# Patient Record
Sex: MALE | Race: BLACK OR AFRICAN AMERICAN | Employment: UNEMPLOYED | URBAN - METROPOLITAN AREA
[De-identification: names, ages, dates, MRNs, and addresses within clinical notes are randomized per-mention and may not be internally consistent; named-entity substitution may affect disease eponyms.]

---

## 2018-09-11 LAB
ALBUMIN SERPL-MCNC: 4.6 G/DL
ALP BLD-CCNC: 47 U/L
ALT SERPL-CCNC: 116 U/L
ANION GAP SERPL CALCULATED.3IONS-SCNC: 11 MMOL/L
AST SERPL-CCNC: 76 U/L
BASOPHILS ABSOLUTE: 0 /ΜL
BASOPHILS RELATIVE PERCENT: 0.5 %
BILIRUB SERPL-MCNC: 0.8 MG/DL (ref 0.1–1.4)
BUN BLDV-MCNC: 26 MG/DL
CALCIUM SERPL-MCNC: 9.5 MG/DL
CHLORIDE BLD-SCNC: 104 MMOL/L
CO2: 29 MMOL/L
CREAT SERPL-MCNC: 1.3 MG/DL
EOSINOPHILS ABSOLUTE: 0.3 /ΜL
EOSINOPHILS RELATIVE PERCENT: 3.6 %
GFR CALCULATED: >60
GLUCOSE BLD-MCNC: 98 MG/DL
HCT VFR BLD CALC: 47.6 % (ref 41–53)
HEMOGLOBIN: 15.9 G/DL (ref 13.5–17.5)
LYMPHOCYTES ABSOLUTE: 2.5 /ΜL
LYMPHOCYTES RELATIVE PERCENT: 33.8 %
MCH RBC QN AUTO: 30.8 PG
MCHC RBC AUTO-ENTMCNC: 33.4 G/DL
MCV RBC AUTO: 92.2 FL
MONOCYTES ABSOLUTE: 0.6 /ΜL
MONOCYTES RELATIVE PERCENT: 7.7 %
NEUTROPHILS ABSOLUTE: 4.1 /ΜL
NEUTROPHILS RELATIVE PERCENT: 54.4 %
PDW BLD-RTO: 13.4 %
PLATELET # BLD: 202 K/ΜL
PMV BLD AUTO: 8.3 FL
POTASSIUM SERPL-SCNC: 4 MMOL/L
RBC # BLD: 5.2 10^6/ΜL
SODIUM BLD-SCNC: 140 MMOL/L
TOTAL PROTEIN: 7.6
WBC # BLD: 7.5 10^3/ML

## 2018-10-10 ENCOUNTER — HOSPITAL ENCOUNTER (EMERGENCY)
Age: 33
Discharge: HOME | End: 2018-10-10
Attending: EMERGENCY MEDICINE
Payer: COMMERCIAL

## 2018-10-10 VITALS
HEIGHT: 73 IN | BODY MASS INDEX: 30.48 KG/M2 | RESPIRATION RATE: 14 BRPM | TEMPERATURE: 98.5 F | SYSTOLIC BLOOD PRESSURE: 159 MMHG | WEIGHT: 230 LBS | DIASTOLIC BLOOD PRESSURE: 73 MMHG | HEART RATE: 66 BPM | OXYGEN SATURATION: 98 %

## 2018-10-10 DIAGNOSIS — R09.81 NASAL CONGESTION: Primary | ICD-10-CM

## 2018-10-10 DIAGNOSIS — J01.10 ACUTE FRONTAL SINUSITIS, RECURRENCE NOT SPECIFIED: ICD-10-CM

## 2018-10-10 PROCEDURE — 99283 EMERGENCY DEPT VISIT LOW MDM: CPT

## 2018-10-10 RX ORDER — CEFDINIR 300 MG/1
300 CAPSULE ORAL 2 TIMES DAILY
Qty: 20 CAPSULE | Refills: 0 | Status: SHIPPED | OUTPATIENT
Start: 2018-10-10 | End: 2018-10-20

## 2018-10-10 ASSESSMENT — ENCOUNTER SYMPTOMS
SINUS PRESSURE: 1
WHEEZING: 0
EYE DISCHARGE: 0
SHORTNESS OF BREATH: 0
FACIAL SWELLING: 0
SINUS PAIN: 1
EYE REDNESS: 0
STRIDOR: 0
BACK PAIN: 0
CONSTIPATION: 0
SORE THROAT: 0
DIARRHEA: 0
BLOOD IN STOOL: 0
CHOKING: 0
ABDOMINAL PAIN: 0
TROUBLE SWALLOWING: 0
CHEST TIGHTNESS: 0
COUGH: 1
EYE PAIN: 0
VOICE CHANGE: 0
VOMITING: 0

## 2018-10-10 NOTE — ED PROVIDER NOTES
[address] [place] [address] ED  eMERGENCY dEPARTMENT eNCOUnter      Pt Name: Mary Burrows  MRN: [id number]  [place] 1985  Date of evaluation: 10/10/2018  Provider: Janifer Simmonds, MD    [address] [place] [address]       Chief Complaint   Patient presents with    Nausea     no vomiting    Cough     pt. states coughed up blood 3 days ago    Dizziness       HISTORY OF PRESENT ILLNESS   (Location/Symptom, Timing/Onset,Context/Setting, Quality, Duration, Modifying Factors, Severity)  Note limiting factors. Mary Burrows is a 29 y.o. male who presents to the emergency department Patient comes with the congestion no congestion postnasal drip denied any chest tightness or chest pain patient usually a nosebleed when he was child patient is an ex-smoker has a chills but no documented fever patient nonsmoker at this time the symptoms are there for last to 3 weeks time decided to come today to be checked out    HPI    NursingNotes were reviewed. REVIEW OF SYSTEMS    (2-9 systems for level 4, 10 or more for level 5)     Review of Systems   Constitutional: Positive for activity change, appetite change and chills. Negative for fever. HENT: Positive for postnasal drip, sinus pain and sinus pressure. Negative for congestion, drooling, facial swelling, mouth sores, nosebleeds, sore throat, trouble swallowing and voice change. Eyes: Negative for pain, discharge, redness and visual disturbance. Respiratory: Positive for cough. Negative for choking, chest tightness, shortness of breath, wheezing and stridor. Cardiovascular: Negative for chest pain, palpitations and leg swelling. Gastrointestinal: Negative for abdominal pain, blood in stool, constipation, diarrhea and vomiting. Endocrine: Negative for cold intolerance, polyphagia and polyuria. Genitourinary: Negative for dysuria, flank pain, frequency, genital sores and urgency. Musculoskeletal: Negative for back pain, joint swelling, neck pain and neck stiffness.    Skin: Negative for pallor and rash. Neurological: Negative for tremors, seizures, syncope, weakness, numbness and headaches. Hematological: Negative for adenopathy. Does not bruise/bleed easily. Psychiatric/Behavioral: Negative for agitation, behavioral problems, hallucinations and sleep disturbance. The patient is not hyperactive. All other systems reviewed and are negative. Except as noted above the remainder of the review of systems was reviewed and negative. PAST MEDICAL HISTORY   History reviewed. No pertinent past medical history. SURGICALHISTORY     History reviewed. No pertinent surgical history. CURRENT MEDICATIONS       Previous Medications    No medications on file       ALLERGIES     Patient has no known allergies. FAMILY HISTORY     History reviewed. No pertinent family history. SOCIAL HISTORY       Social History     Social History    Marital status: Single     Spouse name: N/A    Number of children: N/A    Years of education: N/A     Social History Main Topics    Smoking status: Never Smoker    Smokeless tobacco: None    Alcohol use No    Drug use: Yes     Types: Marijuana    Sexual activity: Not Asked     Other Topics Concern    None     Social History Narrative    None       SCREENINGS      @FLOW([id number])@      PHYSICAL EXAM    (up to 7 for level 4, 8 or more for level 5)     ED Triage Vitals [10/10/18 1624]   BP Temp Temp Source Pulse Resp SpO2 Height Weight   (!) 158/108 98.5 °F (36.9 °C) Oral 66 14 98 % 6' 1\" (1.854 m) 230 lb (104.3 kg)       Physical Exam   Constitutional: He is oriented to person, place, and time. He appears well-developed. HENT:   Head: Normocephalic. Active diagnoses and sounds very congested nasally with erythema of the nasal turbinates no active bleeding from the nose noted at this time patient will the oropharynx no blisters no ulcer abscess noted to the   Eyes: Right eye exhibits no discharge. Left eye exhibits no discharge.

## 2018-11-14 ENCOUNTER — OFFICE VISIT (OUTPATIENT)
Dept: FAMILY MEDICINE CLINIC | Age: 33
End: 2018-11-14
Payer: COMMERCIAL

## 2018-11-14 VITALS
WEIGHT: 240 LBS | SYSTOLIC BLOOD PRESSURE: 112 MMHG | DIASTOLIC BLOOD PRESSURE: 64 MMHG | HEART RATE: 72 BPM | BODY MASS INDEX: 32.51 KG/M2 | RESPIRATION RATE: 14 BRPM | TEMPERATURE: 97.6 F | HEIGHT: 72 IN

## 2018-11-14 DIAGNOSIS — R74.8 ELEVATED LIVER ENZYMES: ICD-10-CM

## 2018-11-14 DIAGNOSIS — Z00.00 HEALTHCARE MAINTENANCE: ICD-10-CM

## 2018-11-14 DIAGNOSIS — R68.89 HEAT INTOLERANCE: ICD-10-CM

## 2018-11-14 DIAGNOSIS — K52.9 CHRONIC DIARRHEA: Primary | ICD-10-CM

## 2018-11-14 DIAGNOSIS — Z11.4 SCREENING FOR HIV (HUMAN IMMUNODEFICIENCY VIRUS): ICD-10-CM

## 2018-11-14 PROCEDURE — G8417 CALC BMI ABV UP PARAM F/U: HCPCS | Performed by: FAMILY MEDICINE

## 2018-11-14 PROCEDURE — 99204 OFFICE O/P NEW MOD 45 MIN: CPT | Performed by: FAMILY MEDICINE

## 2018-11-14 PROCEDURE — 1036F TOBACCO NON-USER: CPT | Performed by: FAMILY MEDICINE

## 2018-11-14 PROCEDURE — G8484 FLU IMMUNIZE NO ADMIN: HCPCS | Performed by: FAMILY MEDICINE

## 2018-11-14 PROCEDURE — G8427 DOCREV CUR MEDS BY ELIG CLIN: HCPCS | Performed by: FAMILY MEDICINE

## 2018-11-14 ASSESSMENT — PATIENT HEALTH QUESTIONNAIRE - PHQ9
SUM OF ALL RESPONSES TO PHQ QUESTIONS 1-9: 0
SUM OF ALL RESPONSES TO PHQ9 QUESTIONS 1 & 2: 0
SUM OF ALL RESPONSES TO PHQ QUESTIONS 1-9: 0
2. FEELING DOWN, DEPRESSED OR HOPELESS: 0
1. LITTLE INTEREST OR PLEASURE IN DOING THINGS: 0

## 2018-11-14 NOTE — PATIENT INSTRUCTIONS
clean. Wash your hands, cutting boards, and countertops with hot soapy water frequently. · Cook meat until it is well done. · Do not eat raw eggs or uncooked sauces made with raw eggs. · Do not take chances. If food looks or tastes spoiled, throw it out. When should you call for help? Call 911 anytime you think you may need emergency care. For example, call if:    · You vomit blood or what looks like coffee grounds.     · You passed out (lost consciousness).     · You pass maroon or very bloody stools.    Call your doctor now or seek immediate medical care if:    · You have severe belly pain.     · You have signs of needing more fluids. You have sunken eyes, a dry mouth, and pass only a little dark urine.     · You feel like you are going to faint.     · You have increased belly pain that does not go away in 1 to 2 days.     · You have new or increased nausea, or you are vomiting.     · You have a new or higher fever.     · Your stools are black and tarlike or have streaks of blood.    Watch closely for changes in your health, and be sure to contact your doctor if:    · You are dizzy or lightheaded.     · You urinate less than usual, or your urine is dark yellow or brown.     · You do not feel better with each day that goes by. Where can you learn more? Go to [email or web address]. org and sign in to your [employer] account. Enter N142 in the [facility] box to learn more about \"Gastroenteritis: Care Instructions. \"     If you do not have an account, please click on the \"Sign Up Now\" link. Current as of: November 18, 2017  Content Version: 11.8  © 3931-1420 [employer]. Care instructions adapted under license by [facility] ([facility]). If you have questions about a medical condition or this instruction, always ask your healthcare professional. [address]  any warranty or liability for your use of this information.

## 2018-11-14 NOTE — PROGRESS NOTES
use: Yes     Types: Marijuana    Sexual activity: Not on file     Other Topics Concern    Not on file     Social History Narrative    No narrative on file       No Known Allergies    Review of Systems - General ROS: negative for - fatigue, fever, malaise, night sweats, sleep disturbance or weight loss  Psychological ROS: negative for - anxiety, concentration difficulties, depression, memory difficulties or sleep disturbances  ENT ROS: negative for - hearing change, nasal discharge, oral lesions, sinus pain, sore throat, tinnitus or vertigo  Allergy and Immunology ROS: negative for - hives, nasal congestion or seasonal allergies  Hematological and Lymphatic ROS: negative for - bruising, fatigue, night sweats or pallor  Endocrine ROS: negative for - hot flashes, malaise/lethargy, palpitations, polydipsia/polyuria, skin changes, temperature intolerance or unexpected weight changes  Respiratory ROS: negative for - cough, hemoptysis, pleuritic pain, shortness of breath or wheezing  Cardiovascular ROS: no chest pain or dyspnea on exertion  Gastrointestinal ROS: as noted in HPI  Genito-Urinary ROS: no dysuria, trouble voiding, or hematuria  Musculoskeletal ROS: negative for - joint pain, joint stiffness, joint swelling, muscle pain or muscular weakness  Neurological ROS: negative for - dizziness, gait disturbance, headaches, impaired coordination/balance, numbness/tingling, tremors or visual changes  Dermatological ROS: negative for - dry skin, lumps, pruritus or rash    Blood pressure 112/64, pulse 72, temperature 97.6 °F (36.4 °C), temperature source Temporal, resp. rate 14, height 5' 11.75\" (1.822 m), weight 240 lb (108.9 kg).     Physical Examination: General appearance - alert, well appearing, and in no distress  Mental status - alert, oriented to person, place, and time  Eyes - pupils equal and reactive, extraocular eye movements intact  Ears - bilateral TM's and external ear canals normal  Mouth - mucous membranes provided. They understand and agree with this course of treatment. They will return with new or worsening symptoms. Patient instructed to remain current with appropriate annual health maintenance. Natural history and expected course discussed. Questions answered. Educational materials distributed. They understand and agree with this course of treatment. They will return with new or worsening symptoms. Patient instructed to remain current with appropriate annual health maintenance.    Follow up if persistent or worsening symptoms otherwise follow up after test result and prn

## 2018-11-19 ENCOUNTER — HOSPITAL ENCOUNTER (OUTPATIENT)
Dept: ULTRASOUND IMAGING | Age: 33
Discharge: HOME | End: 2018-11-21
Payer: COMMERCIAL

## 2018-11-19 DIAGNOSIS — Z11.4 SCREENING FOR HIV (HUMAN IMMUNODEFICIENCY VIRUS): ICD-10-CM

## 2018-11-19 DIAGNOSIS — K52.9 CHRONIC DIARRHEA: ICD-10-CM

## 2018-11-19 DIAGNOSIS — Z00.00 HEALTHCARE MAINTENANCE: ICD-10-CM

## 2018-11-19 DIAGNOSIS — R74.8 ELEVATED LIVER ENZYMES: ICD-10-CM

## 2018-11-19 DIAGNOSIS — R68.89 HEAT INTOLERANCE: ICD-10-CM

## 2018-11-19 LAB
ALBUMIN SERPL-MCNC: 4.8 G/DL (ref 3.9–4.9)
ALP BLD-CCNC: 44 U/L (ref 35–104)
ALT SERPL-CCNC: 89 U/L (ref 0–41)
ANION GAP SERPL CALCULATED.3IONS-SCNC: 16 MEQ/L (ref 7–13)
AST SERPL-CCNC: 42 U/L (ref 0–40)
BASOPHILS ABSOLUTE: 0 K/UL (ref 0–0.2)
BASOPHILS RELATIVE PERCENT: 0.5 %
BILIRUB SERPL-MCNC: 0.8 MG/DL (ref 0–1.2)
BUN BLDV-MCNC: 20 MG/DL (ref 6–20)
C-REACTIVE PROTEIN: 0.4 MG/L (ref 0–5)
CALCIUM SERPL-MCNC: 9.9 MG/DL (ref 8.6–10.2)
CHLORIDE BLD-SCNC: 105 MEQ/L (ref 98–107)
CHOLESTEROL, TOTAL: 217 MG/DL (ref 0–199)
CO2: 24 MEQ/L (ref 22–29)
CREAT SERPL-MCNC: 1.08 MG/DL (ref 0.7–1.2)
EOSINOPHILS ABSOLUTE: 0.4 K/UL (ref 0–0.7)
EOSINOPHILS RELATIVE PERCENT: 6.1 %
GFR AFRICAN AMERICAN: >60
GFR NON-AFRICAN AMERICAN: >60
GLOBULIN: 2.6 G/DL (ref 2.3–3.5)
GLUCOSE BLD-MCNC: 92 MG/DL (ref 74–109)
HCT VFR BLD CALC: 49.4 % (ref 42–52)
HDLC SERPL-MCNC: 54 MG/DL (ref 40–59)
HEMOGLOBIN: 16.9 G/DL (ref 14–18)
HEPATITIS B SURFACE ANTIGEN INTERPRETATION: NORMAL
HEPATITIS C ANTIBODY INTERPRETATION: NORMAL
LDL CHOLESTEROL CALCULATED: 149 MG/DL (ref 0–129)
LYMPHOCYTES ABSOLUTE: 2.9 K/UL (ref 1–4.8)
LYMPHOCYTES RELATIVE PERCENT: 41.5 %
MCH RBC QN AUTO: 32.4 PG (ref 27–31.3)
MCHC RBC AUTO-ENTMCNC: 34.1 % (ref 33–37)
MCV RBC AUTO: 95.1 FL (ref 80–100)
MONOCYTES ABSOLUTE: 0.5 K/UL (ref 0.2–0.8)
MONOCYTES RELATIVE PERCENT: 6.9 %
NEUTROPHILS ABSOLUTE: 3.2 K/UL (ref 1.4–6.5)
NEUTROPHILS RELATIVE PERCENT: 45 %
PDW BLD-RTO: 13.3 % (ref 11.5–14.5)
PLATELET # BLD: 190 K/UL (ref 130–400)
POTASSIUM SERPL-SCNC: 3.7 MEQ/L (ref 3.5–5.1)
RBC # BLD: 5.2 M/UL (ref 4.7–6.1)
SEDIMENTATION RATE, ERYTHROCYTE: 2 MM (ref 0–10)
SODIUM BLD-SCNC: 145 MEQ/L (ref 132–144)
TOTAL PROTEIN: 7.4 G/DL (ref 6.4–8.1)
TRIGL SERPL-MCNC: 71 MG/DL (ref 0–200)
TSH SERPL DL<=0.05 MIU/L-ACNC: 0.5 UIU/ML (ref 0.27–4.2)
WBC # BLD: 7 K/UL (ref 4.8–10.8)

## 2018-11-19 PROCEDURE — 76705 ECHO EXAM OF ABDOMEN: CPT

## 2018-11-22 LAB
ALLERGEN CASEIN: <0.1 KU/L
ALLERGEN CLAMS IGE: <0.1 KU/L
ALLERGEN CODFISH IGE: <0.1 KU/L
ALLERGEN CORN IGE: <0.1 KU/L
ALLERGEN COW MILK IGE: <0.1 KU/L
ALLERGEN EGG WHITE IGE: <0.1 KU/L
ALLERGEN PEANUT (F13) IGE: <0.1 KU/L
ALLERGEN SCALLOP IGE: <0.1 KU/L
ALLERGEN SEE NOTE: ABNORMAL
ALLERGEN SHRIMP IGE: 0.94 KU/L
ALLERGEN SOYBEAN IGE: <0.1 KU/L
ALLERGEN WALNUT IGE: <0.1 KU/L
ALLERGEN WHEAT IGE: <0.1 KU/L
GLIADIN ANTIBODIES IGG: 6 UNITS (ref 0–19)
HIV 1,2 COMBO ANTIGEN/ANTIBODY: NEGATIVE
IGE: 44 KU/L
TISSUE TRANSGLUTAMINASE IGA: 0 U/ML (ref 0–3)

## 2018-11-23 ENCOUNTER — PATIENT MESSAGE (OUTPATIENT)
Dept: FAMILY MEDICINE CLINIC | Age: 33
End: 2018-11-23

## 2018-11-24 LAB — CELIAC PANEL: 8 UNITS (ref 0–19)

## 2018-12-04 ENCOUNTER — OFFICE VISIT (OUTPATIENT)
Dept: FAMILY MEDICINE CLINIC | Age: 33
End: 2018-12-04
Payer: COMMERCIAL

## 2018-12-04 VITALS
SYSTOLIC BLOOD PRESSURE: 114 MMHG | WEIGHT: 235 LBS | DIASTOLIC BLOOD PRESSURE: 64 MMHG | BODY MASS INDEX: 31.83 KG/M2 | HEIGHT: 72 IN | RESPIRATION RATE: 12 BRPM | HEART RATE: 72 BPM | TEMPERATURE: 97.6 F

## 2018-12-04 DIAGNOSIS — R74.8 ELEVATED LIVER ENZYMES: ICD-10-CM

## 2018-12-04 DIAGNOSIS — K58.0 IRRITABLE BOWEL SYNDROME WITH DIARRHEA: Primary | ICD-10-CM

## 2018-12-04 LAB
ALBUMIN SERPL-MCNC: 4.9 G/DL (ref 3.9–4.9)
ALP BLD-CCNC: 47 U/L (ref 35–104)
ALT SERPL-CCNC: 83 U/L (ref 0–41)
AST SERPL-CCNC: 46 U/L (ref 0–40)
BILIRUB SERPL-MCNC: 0.6 MG/DL (ref 0–1.2)
BILIRUBIN DIRECT: <0.2 MG/DL (ref 0–0.3)
BILIRUBIN, INDIRECT: ABNORMAL MG/DL (ref 0–0.6)
FERRITIN: 217.2 NG/ML (ref 30–400)
TOTAL PROTEIN: 7.4 G/DL (ref 6.4–8.1)

## 2018-12-04 PROCEDURE — G8484 FLU IMMUNIZE NO ADMIN: HCPCS | Performed by: FAMILY MEDICINE

## 2018-12-04 PROCEDURE — 99213 OFFICE O/P EST LOW 20 MIN: CPT | Performed by: FAMILY MEDICINE

## 2018-12-04 PROCEDURE — G8427 DOCREV CUR MEDS BY ELIG CLIN: HCPCS | Performed by: FAMILY MEDICINE

## 2018-12-04 PROCEDURE — G8417 CALC BMI ABV UP PARAM F/U: HCPCS | Performed by: FAMILY MEDICINE

## 2018-12-04 PROCEDURE — 1036F TOBACCO NON-USER: CPT | Performed by: FAMILY MEDICINE

## 2018-12-04 NOTE — PROGRESS NOTES
Subjective:       Chief Complaint   Patient presents with    Results     lab results       Paulina Torres is a 29 y.o. male who presents today for FU of Results (lab results )       His abdominal pain has improved. He denies nausea no vomiting. No further diarrhea. He denies blood or mucus in his stool and no hematemesis. History reviewed. No pertinent past medical history. There are no active problems to display for this patient. Past Surgical History:   Procedure Laterality Date    DENTAL SURGERY  2017     Family History   Problem Relation Age of Onset    Hypertension Mother     Hypertension Maternal Grandmother     Heart Disease Maternal Grandmother     Diabetes Maternal Grandmother     Diabetes Other         [employer]     Social History     Social History    Marital status: Single     Spouse name: N/A    Number of children: N/A    Years of education: N/A     Social History Main Topics    Smoking status: Former Smoker     Packs/day: 0.75     Years: 20.00     Types: Cigarettes     Quit date: 11/14/2010    Smokeless tobacco: Never Used    Alcohol use No    Drug use: Yes     Types: Marijuana    Sexual activity: Not Asked     Other Topics Concern    None     Social History Narrative    None     No current outpatient prescriptions on file. No current facility-administered medications for this visit. No current outpatient prescriptions on file prior to visit. No current facility-administered medications on file prior to visit.       No Known Allergies     Review of Systems - General ROS: negative for - fatigue, fever, malaise, night sweats, sleep disturbance or weight loss  Psychological ROS: negative for - anxiety, concentration difficulties, depression, memory difficulties or sleep disturbances  ENT ROS: negative for - hearing change, nasal discharge, oral lesions, sinus pain, sore throat, tinnitus or vertigo  Allergy and Immunology ROS: negative for - hives, nasal Only on 11/19/2018   Component Date Value Ref Range Status    Sodium 11/19/2018 145* 132 - 144 mEq/L Final    Potassium 11/19/2018 3.7  3.5 - 5.1 mEq/L Final    Chloride 11/19/2018 105  98 - 107 mEq/L Final    CO2 11/19/2018 24  22 - 29 mEq/L Final    Anion Gap 11/19/2018 16* 7 - 13 mEq/L Final    Glucose 11/19/2018 92  74 - 109 mg/dL Final    BUN 11/19/2018 20  6 - 20 mg/dL Final    CREATININE 11/19/2018 1.08  0.70 - 1.20 mg/dL Final    GFR Non- 11/19/2018 >60.0  >60 Final    Comment: >60 mL/min/1.73m2 EGFR, calc. for ages 25 and older using the  MDRD formula (not corrected for weight), is valid for stable  renal function.  GFR  11/19/2018 >60.0  >60 Final    Comment: >60 mL/min/1.73m2 EGFR, calc. for ages 25 and older using the  MDRD formula (not corrected for weight), is valid for stable  renal function.  Calcium 11/19/2018 9.9  8.6 - 10.2 mg/dL Final    Total Protein 11/19/2018 7.4  6.4 - 8.1 g/dL Final    Alb 11/19/2018 4.8  3.9 - 4.9 g/dL Final    Total Bilirubin 11/19/2018 0.8  0.0 - 1.2 mg/dL Final    Alkaline Phosphatase 11/19/2018 44  35 - 104 U/L Final    ALT 11/19/2018 89* 0 - 41 U/L Final    AST 11/19/2018 42* 0 - 40 U/L Final    Globulin 11/19/2018 2.6  2.3 - 3.5 g/dL Final    Cholesterol, Total 11/19/2018 217* 0 - 199 mg/dL Final    ATP III Cholesterol Classification is Borderline High.  Triglycerides 11/19/2018 71  0 - 200 mg/dL Final    ATP III Triglycerides Classification is Normal.    HDL 11/19/2018 54  40 - 59 mg/dL Final    Comment: ATP III HDL Cholesterol Classification is Desirable. Expected Values:    Males:    >55 = No Risk            35-55 = Moderate Risk            <35 = High Risk    Females:  >65 = No Risk            45-65 = Moderate Risk            <45 = High Risk    NCEP Guidelines:   Third Report May 2001  >59 = negative risk factor for CHD  <40 = major risk factor for CHD      LDL Calculated 11/19/2018 149* 0 - 129 mg/dL 2. Duane Bourdon Duane Bourdon Duane Bourdon Duane Bourdon Moderate   3.51-17.50 kU/L. ........ Duane Bourdon Class 3... Duane Bourdon Duane Bourdon High   17.51-50.00 kU/L. ....... Duane Bourdon Class 4. .. Duane Bourdon Duane Bourdon Very High   50..00 kU/L. ...... Duane Bourdon Class 5. .. Duane Bourdon Duane Bourdon Very High   Greater than 100.00kU/L. Duane Bourdon Class 6. .. Duane Bourdon Duane Bourdon Very High  Allergen results of 0.10-0.34 kU/L are intended for specialist use as  the  clinical relevance is undetermined. Even though increasing ranges are  reflective of increasing concentrations of allergen-specific IgE, these  concentrations may not correlate with the degree of clinical response  or skin  testing results when challenged with a specific allergen. The  correlation of  allergy laboratory results with clinical history and in vivo reactivity  to  specific allergens is essential. A negative test may not rule out  clinical                             allergy or even anaphylaxis. Performed by [facility], [address] [phone]  www. Shavonne Chase MD - Lab. Director     [employer] 11/19/2018 <0.10  <=0.34 kU/L Final    [place] IgE 11/19/2018 <0.10  <=0.34 kU/L Final    Clams IgE 11/19/2018 <0.10  <=0.34 kU/L Final    Scallop IgE 11/19/2018 <0.10  <=0.34 kU/L Final    Casein 11/19/2018 <0.10  <=0.34 kU/L Final    Comment: Performed by [facility], [address] [phone]  www. Shavonne Chase MD - Lab. Director      Hep C Ab Interp 11/19/2018 Non-reactive   Final    Hep B S Ag Interp 11/19/2018 Non-reactive   Final    Sed Rate 11/19/2018 2  0 - 10 mm Final    CRP 11/19/2018 0.4  0.0 - 5.0 mg/L Final    GLIADIN ANTIBODIES IGG 11/19/2018 6  0 - 19 Units Final    Comment: INTERPRETIVE INFORMATION: Deamidated Gliadin Peptide                            (DGP) Ab, IgG  19 Units or less: . .......... Negative  20-30 Units: . ............... Weak Positive  31 Units or greater: . ....... Positive  Performed by [facility] , [address] [facility] [phone]  www. Shavonne Chase MD - Lab.  Director      Celiac Panel 11/19/2018 8  0 - 19 Units Final    Comment: No further celiac testing to be performed. INTERPRETIVE INFORMATION: Celiac Disease Dual Antigen Screen  19 Units or less. ... Sybil Singh Negative - No significant level of                        detectable IgA or IgG antibodies                        against human tissue transglutaminase                        or gliadin peptide. 20 Units or greater. Sybil Singh Positive - Presence of IgA and/or                        IgG antibodies against human tissue                        transglutaminase and/or gliadin                        peptide; suggests possibility of                        certain gluten sensitive enteropathies                        such as celiac disease and dermatitis                        herpetiformis. Performed by [facility],  [place] [address], [address] [phone]  www. Binh Bird MD - Lab. Director      TISSUE TRANSGLUTAMINASE IGA 11/19/2018 0  0 - 3 U/mL Final    Comment: No further testing to follow. No further celiac testing to be performed. INTERPRETIVE INFORMATION: Tissue Transglutaminase (tTG) Antibody, IgA  3 U/mL or less: Negative  4-10 U/mL: Weak Positive  11 U/mL or greater: Positive  Presence of the tissue transglutaminase (tTG) IgA antibody is  associated with  glutensensitive enteropathies such as celiac disease and dermatitis  herpetiformis. tTG IgA antibody concentrations greater than 40 U/mL  usually  correlate with results of duodenal biopsies consistent with a diagnosis  of  celiac disease. For antibody concentrations greater or equal to 4 U/mL  but  less than or equal to 40 U/mL, additional testing for endomysial (EMA)  IgA  concentrations may improve the positive predictive value for disease. Performed by [facility],  [facility] [address], [address] [phone]  www. Binh Bird MD - Lab.  Director      HIV 1,2 Combo Antigen/Antibody 11/19/2018 Negative  Negative Final    Comment: The specimen was non-reactive for HIV-1 and HIV-2 antibodies, and p24  antigen. Based on this non-reactive screen result, further reflexive  testing  was not indicated and was, therefore, not performed  INTERPRETIVE INFORMATION: HIV-1,2 Combo Ag/Ab EIA, w/Reflex  This assay should not be used for blood donor screening, associated  re-entry  protocols, or for screening Human Cells, Tissues, and Cellular and  Tissue-Based Products (HCT/P). Performed by [place] [address], [address] [facility] [phone]  www. Frederick Hines MD - Lab. Director         Test results reviewed and discussed with the patient     Assessment / Plan:     Encounter Diagnoses   Name Primary?  Irritable bowel syndrome with diarrhea Yes    Elevated liver enzymes        No outpatient encounter prescriptions on file as of 12/4/2018. No facility-administered encounter medications on file as of 12/4/2018.         Orders Placed This Encounter   Procedures    Hepatic Function Panel     Standing Status:   Future     Number of Occurrences:   1     Standing Expiration Date:   12/4/2019    Ferritin     Standing Status:   Future     Number of Occurrences:   1     Standing Expiration Date:   12/4/2019    AMADOR Screen With Reflex     Standing Status:   Future     Number of Occurrences:   1     Standing Expiration Date:   12/4/2019    Ceruloplasmin     Standing Status:   Future     Number of Occurrences:   1     Standing Expiration Date:   12/4/2019     Follow up if persistent or worsening symptoms otherwise follow up after test result and prn

## 2018-12-06 LAB — CERULOPLASMIN: 20 MG/DL (ref 15–30)

## 2018-12-07 LAB — ANA IGG, ELISA: NORMAL
